# Patient Record
Sex: MALE | Race: WHITE | NOT HISPANIC OR LATINO | Employment: FULL TIME | URBAN - METROPOLITAN AREA
[De-identification: names, ages, dates, MRNs, and addresses within clinical notes are randomized per-mention and may not be internally consistent; named-entity substitution may affect disease eponyms.]

---

## 2024-08-18 ENCOUNTER — APPOINTMENT (OUTPATIENT)
Dept: RADIOLOGY | Facility: HOSPITAL | Age: 64
End: 2024-08-18
Payer: COMMERCIAL

## 2024-08-18 ENCOUNTER — HOSPITAL ENCOUNTER (EMERGENCY)
Facility: HOSPITAL | Age: 64
Discharge: HOME | End: 2024-08-18
Attending: EMERGENCY MEDICINE
Payer: COMMERCIAL

## 2024-08-18 VITALS
TEMPERATURE: 96.4 F | BODY MASS INDEX: 25.76 KG/M2 | OXYGEN SATURATION: 99 % | RESPIRATION RATE: 16 BRPM | SYSTOLIC BLOOD PRESSURE: 133 MMHG | HEIGHT: 68 IN | WEIGHT: 170 LBS | DIASTOLIC BLOOD PRESSURE: 82 MMHG | HEART RATE: 52 BPM

## 2024-08-18 DIAGNOSIS — S01.111A LACERATION OF RIGHT EYEBROW, INITIAL ENCOUNTER: Primary | ICD-10-CM

## 2024-08-18 PROCEDURE — 76376 3D RENDER W/INTRP POSTPROCES: CPT

## 2024-08-18 PROCEDURE — 90471 IMMUNIZATION ADMIN: CPT

## 2024-08-18 PROCEDURE — 72125 CT NECK SPINE W/O DYE: CPT

## 2024-08-18 PROCEDURE — 12014 RPR F/E/E/N/L/M 5.1-7.5 CM: CPT | Performed by: PHYSICIAN ASSISTANT

## 2024-08-18 PROCEDURE — 99284 EMERGENCY DEPT VISIT MOD MDM: CPT | Performed by: EMERGENCY MEDICINE

## 2024-08-18 PROCEDURE — 70450 CT HEAD/BRAIN W/O DYE: CPT

## 2024-08-18 PROCEDURE — 90715 TDAP VACCINE 7 YRS/> IM: CPT

## 2024-08-18 PROCEDURE — 2500000004 HC RX 250 GENERAL PHARMACY W/ HCPCS (ALT 636 FOR OP/ED)

## 2024-08-18 PROCEDURE — 99285 EMERGENCY DEPT VISIT HI MDM: CPT | Mod: 25

## 2024-08-18 PROCEDURE — 70486 CT MAXILLOFACIAL W/O DYE: CPT

## 2024-08-18 PROCEDURE — 2500000001 HC RX 250 WO HCPCS SELF ADMINISTERED DRUGS (ALT 637 FOR MEDICARE OP)

## 2024-08-18 PROCEDURE — 2500000005 HC RX 250 GENERAL PHARMACY W/O HCPCS: Performed by: PHYSICIAN ASSISTANT

## 2024-08-18 PROCEDURE — 2500000005 HC RX 250 GENERAL PHARMACY W/O HCPCS

## 2024-08-18 RX ORDER — BACITRACIN ZINC 500 UNIT/G
OINTMENT IN PACKET (EA) TOPICAL
Status: COMPLETED
Start: 2024-08-18 | End: 2024-08-18

## 2024-08-18 RX ORDER — LIDOCAINE HYDROCHLORIDE 10 MG/ML
INJECTION INFILTRATION; PERINEURAL
Status: COMPLETED
Start: 2024-08-18 | End: 2024-08-18

## 2024-08-18 RX ORDER — LIDOCAINE HYDROCHLORIDE AND EPINEPHRINE 10; 10 MG/ML; UG/ML
5 INJECTION, SOLUTION INFILTRATION; PERINEURAL ONCE
Status: COMPLETED | OUTPATIENT
Start: 2024-08-18 | End: 2024-08-18

## 2024-08-18 RX ADMIN — BACITRACIN 1 APPLICATION: 500 OINTMENT TOPICAL at 07:30

## 2024-08-18 RX ADMIN — LIDOCAINE HYDROCHLORIDE,EPINEPHRINE BITARTRATE 5 ML: 10; .01 INJECTION, SOLUTION INFILTRATION; PERINEURAL at 07:17

## 2024-08-18 RX ADMIN — LIDOCAINE HYDROCHLORIDE: 10 INJECTION, SOLUTION INFILTRATION; PERINEURAL at 06:55

## 2024-08-18 RX ADMIN — TETANUS TOXOID, REDUCED DIPHTHERIA TOXOID AND ACELLULAR PERTUSSIS VACCINE, ADSORBED 0.5 ML: 5; 2.5; 8; 8; 2.5 SUSPENSION INTRAMUSCULAR at 05:44

## 2024-08-18 ASSESSMENT — COLUMBIA-SUICIDE SEVERITY RATING SCALE - C-SSRS
6. HAVE YOU EVER DONE ANYTHING, STARTED TO DO ANYTHING, OR PREPARED TO DO ANYTHING TO END YOUR LIFE?: NO
1. IN THE PAST MONTH, HAVE YOU WISHED YOU WERE DEAD OR WISHED YOU COULD GO TO SLEEP AND NOT WAKE UP?: NO
2. HAVE YOU ACTUALLY HAD ANY THOUGHTS OF KILLING YOURSELF?: NO

## 2024-08-18 ASSESSMENT — PAIN DESCRIPTION - PROGRESSION: CLINICAL_PROGRESSION: OTHER (COMMENT)

## 2024-08-18 ASSESSMENT — PAIN SCALES - GENERAL: PAINLEVEL_OUTOF10: 5 - MODERATE PAIN

## 2024-08-18 ASSESSMENT — PAIN - FUNCTIONAL ASSESSMENT: PAIN_FUNCTIONAL_ASSESSMENT: 0-10

## 2024-08-18 NOTE — PROGRESS NOTES
Emergency Department Transition of Care Note       Signout   I received Bipin Pugh in signout from Dr. Montez.  Please see the ED Provider Note for all HPI, PE and MDM up to the time of signout at 0700.  This is in addition to the primary record.    In brief Bipin Pugh is an 64 y.o. male presenting for Facial laceration.  CTs performed and resulted by prior provider including face C-spine and head all were unremarkable.  Tetanus was updated.    At the time of signout we were awaiting:  Laceration repair    ED Course & Medical Decision Making   Medical Decision Making:  Under my care, 1% lidocaine with epinephrine instilled local infiltration with good anesthesia.  Area was cleansed thoroughly with Betadine.  Area was probed and to be hemostatic with no foreign bodies and clean.  Six 5-0 Ethilon sutures instilled close approximation simple interrupted without complication.  Bacitracin placed over the laceration repair, Band-Aids placed over the wound.  He was told to have them removed in 7 to 10 days.  To return for any signs of infection.  Patient and wife bedside verbalized understanding discharge plan they agreed with plan all questions were answered.    ED Course:  Diagnoses as of 08/18/24 0756   Laceration of right eyebrow, initial encounter       Disposition   As a result of the work-up, the patient was discharged home.  he was informed of his diagnosis and instructed to come back with any concerns or worsening of condition.  he and was agreeable to the plan as discussed above.  he was given the opportunity to ask questions.  All of the patient's questions were answered.    Procedures   Procedures    Amrit Wayne PA-C  Emergency Medicine

## 2024-08-18 NOTE — DISCHARGE INSTRUCTIONS
Have the sutures removed in 7 to 10 days.  Apply antibiotic ointment for the first few days and then sunscreen/Vaseline to help decrease likelihood for scarring.  Return for any signs of infection or true closest emergency room.

## 2024-08-18 NOTE — ED TRIAGE NOTES
Pt was in a dark room and was walking and tripped hitting his head on a table not on blood thinners, - LOC, has right eyebrow laceration

## 2024-08-18 NOTE — ED PROVIDER NOTES
HPI   Chief Complaint   Patient presents with    Facial Laceration       HPI  Patient is a 64-year-old male who presents to the emergency department following after an accidental fall with facial laceration.  Patient states that he is from [place] and is visiting a friend.  He states that he woke up and while walking in a dark room, he accidentally tripped on the rug, and then hit his face on a table as he fell.  No loss of consciousness. Patient states that he is not in much pain, but that he has always had an issue with not feeling pain very well.  Patient states that there was bleeding initially from a laceration above his right eye, but it has been controlled with direct pressure with a gauze and bandage.  Patient states that he is not completely sure if his tetanus status is up-to-date. Patient denies confusion, headache, vision changes, tinnitus, or dizziness.  Patient denies neck pain, chest pain, shortness of breath, abdominal pain, nausea or vomiting, numbness, tingling, or weakness. He has been ambulatory since the incident. No injuries to his arms or legs.     Patient History   No past medical history on file.  No past surgical history on file.  No family history on file.  Social History     Tobacco Use    Smoking status: Not on file    Smokeless tobacco: Not on file   Substance Use Topics    Alcohol use: Not on file    Drug use: Not on file       Physical Exam   ED Triage Vitals [08/18/24 0243]   Temperature Heart Rate Respirations BP   35.8 °C (96.4 °F) 52 16 133/82      Pulse Ox Temp src Heart Rate Source Patient Position   99 % -- -- --      BP Location FiO2 (%)     -- --       Physical Exam  Vitals and nursing note reviewed.   Constitutional:       General: He is not in acute distress.     Appearance: He is well-developed.   HENT:      Head: Normocephalic.      Comments: Approximately 5 cm curvilinear laceration above patient's right eyebrow. No active bleeding. No visible foreign bodies.    Ecchymosis noted to medial aspect under patient's right eye.     Nose:      Comments: Some dried blood over nose but no septal hematoma.      Mouth/Throat:      Mouth: Mucous membranes are moist.   Eyes:      Extraocular Movements: Extraocular movements intact.      Conjunctiva/sclera: Conjunctivae normal.      Pupils: Pupils are equal, round, and reactive to light.   Cardiovascular:      Rate and Rhythm: Normal rate and regular rhythm.      Heart sounds: No murmur heard.  Pulmonary:      Effort: Pulmonary effort is normal. No respiratory distress.      Breath sounds: Normal breath sounds.   Abdominal:      General: There is no distension.      Palpations: Abdomen is soft.      Tenderness: There is no abdominal tenderness.   Musculoskeletal:         General: No swelling. Normal range of motion.      Cervical back: Normal range of motion and neck supple. No tenderness.   Skin:     General: Skin is warm and dry.      Capillary Refill: Capillary refill takes less than 2 seconds.   Neurological:      General: No focal deficit present.      Mental Status: He is alert.      Cranial Nerves: No cranial nerve deficit.      Sensory: No sensory deficit.      Motor: No weakness.   Psychiatric:         Mood and Affect: Mood normal.         ED Course & MDM   Diagnoses as of 08/18/24 0600   Laceration of right eyebrow, initial encounter        Medical Decision Making  Patient is a 64-year-old male with no significant past medical history who presents to the emergency department following an accidental fall with facial laceration.In the Emergency Department, hospital records were reviewed. The patient is afebrile with stable vital signs. The patient is in no respiratory distress, satting well on room air. Patient is neurologically and neurovascularly intact. Patient was given Boostrix vaccine in the ED. He was offered pain medication but denied needing anything for pain at this time. Given the extent of patient's laceration along  with ecchymosis surrounding the right eye, a CT head, C-spine, and maxillofacial bones without contrast was ordered to assess for any possible fractures or traumatic injuries.  Patient CT scan showed:  -No acute intracranial abnormality.  -Swelling overlying the right frontal sinus, the nasal bones and right  periorbital soft tissues compatible with soft tissue contusion in the  setting of trauma.  -No acute facial bone fracture.  -Cervical spondylosis without acute loss of vertebral body height or  traumatic malalignment.  Upon signout at 0700 hrs., patient care transferred to VITALY Pederson with patient stable condition awaiting suture repair of his forehead laceration. Patient remains stable and is pending remainder of ED course and final disposition.     Procedure  Procedures none     Bowen Montez DO  Resident  08/18/24 6629    I saw and evaluated the patient. I personally obtained the key and critical portions of the history and physical exam or was physically present for key and critical portions performed by the resident/fellow. I reviewed the resident/fellow's documentation and discussed the patient with the resident/fellow. I agree with the resident/fellow's medical decision making as documented in the note. Patient is stable and was signed out to my colleague, Dr. Jos La at 7 AM pending laceration repair and pending remainder of ED course and final disposition.     MD Mayra Castillo MD  08/18/24 194

## 2024-08-18 NOTE — ED PROCEDURE NOTE
Procedure  Laceration Repair    Performed by: Amrit Wayne PA-C  Authorized by: Amrit Wayne PA-C    Consent:     Consent obtained:  Verbal    Consent given by:  Patient    Risks, benefits, and alternatives were discussed: yes      Risks discussed:  Infection, pain, vascular damage, need for additional repair, poor wound healing and poor cosmetic result    Alternatives discussed:  No treatment  Universal protocol:     Procedure explained and questions answered to patient or proxy's satisfaction: yes      Patient identity confirmed:  Verbally with patient  Anesthesia:     Anesthesia method:  Local infiltration    Local anesthetic:  Lidocaine 1% WITH epi  Laceration details:     Location:  Face    Face location:  Forehead    Length (cm):  7  Pre-procedure details:     Preparation:  Patient was prepped and draped in usual sterile fashion  Treatment:     Area cleansed with:  Povidone-iodine    Amount of cleaning:  Standard  Skin repair:     Repair method:  Sutures    Suture size:  5-0    Suture material:  Nylon    Suture technique:  Simple interrupted    Number of sutures:  6  Approximation:     Approximation:  Close  Repair type:     Repair type:  Simple  Post-procedure details:     Dressing:  Antibiotic ointment    Procedure completion:  Tolerated               Amrit Wayne PA-C  08/18/24 0756